# Patient Record
Sex: FEMALE | Race: AMERICAN INDIAN OR ALASKA NATIVE | NOT HISPANIC OR LATINO | URBAN - METROPOLITAN AREA
[De-identification: names, ages, dates, MRNs, and addresses within clinical notes are randomized per-mention and may not be internally consistent; named-entity substitution may affect disease eponyms.]

---

## 2018-03-16 ENCOUNTER — EMERGENCY (EMERGENCY)
Facility: HOSPITAL | Age: 38
LOS: 0 days | Discharge: HOME | End: 2018-03-16
Attending: EMERGENCY MEDICINE

## 2018-03-16 VITALS
SYSTOLIC BLOOD PRESSURE: 100 MMHG | HEART RATE: 72 BPM | OXYGEN SATURATION: 99 % | DIASTOLIC BLOOD PRESSURE: 61 MMHG | RESPIRATION RATE: 18 BRPM

## 2018-03-16 VITALS
DIASTOLIC BLOOD PRESSURE: 78 MMHG | OXYGEN SATURATION: 100 % | TEMPERATURE: 99 F | SYSTOLIC BLOOD PRESSURE: 106 MMHG | HEART RATE: 84 BPM | RESPIRATION RATE: 18 BRPM

## 2018-03-16 DIAGNOSIS — R42 DIZZINESS AND GIDDINESS: ICD-10-CM

## 2018-03-16 DIAGNOSIS — R55 SYNCOPE AND COLLAPSE: ICD-10-CM

## 2018-03-16 NOTE — ED PROVIDER NOTE - PHYSICAL EXAMINATION
Alert, NAD, WDWN, well-appearing  PERRL, EOMI, normal pupils, no icterus, normal external ENT, pink/moist membranes  Airway intact, Lungs CTAB, no wheezing or rhonchi, normal resp effort w/o tachypnea, no retractions, speaking full sentences  CVS1S2, RRR, no m/g/r, 2+ pulses b/l, warm/well-perfused  Abdomen soft, nondistended, no tenderness on palpation to all 4 quadrants, no rebound or rigidity, no CVA tenderness  Skin warm/dry, no acute rash  AAOx3, CN 2-12 intact, no nystagmus, no pronator drift, normal motor, normal sensory, normal gait, no romberg's

## 2018-03-16 NOTE — ED ADULT NURSE NOTE - ASSOCIATED SYMPTOMS
pt is in the ED for an episode that occurred today , near syncope episode, pt denies loc , falling . pt states "felt weak but was aware of all surroundings " symptoms are resolved at this time

## 2018-03-16 NOTE — ED ADULT TRIAGE NOTE - NS ED NURSE BANDS TYPE
No results found for: ABORH, LABANTI, ABID    Chief Complaint   Patient presents with   • Routine Prenatal Visit     Pt. complains of having a yeast infection and states that her insurance will not cover Terazol cream.  Pt was given a sample       Today Chasity complains of vaginal discharge  See ob flowsheet for physical exam.    Prenatal Assessment  Fetal Heart Rate: 140  Fundal Height (cm): 26 cm  Movement: Present  Prenatal Vitals  BP: 110/70  Weight: 134 lb (60.8 kg)  Vaginal Drainage  Draining Fluid: Yes  Edema  LLE Edema: None  RLE Edema: None  Facial Edema: None     Tests done today: Swab for yeast and bacterial vaginosis  At the time of the next visit, she will need to have none    Impression:   Encounter Diagnoses   Name Primary?   • Habitual aborter, antepartum condition or complication, second trimester Yes   • Depression with anxiety    • Vaginal discharge during pregnancy in second trimester        Plan: Patient's Glucola was 92, discharge still present will do one swab for yeast and bacterial infection, return 2 weeks    This note was electronically signed.    Hakeem Retana MD    Name band;

## 2018-03-16 NOTE — ED PROVIDER NOTE - NS ED ROS FT
Review of Systems:  	•	CONSTITUTIONAL - no fever, no diaphoresis, no chills  	•	SKIN - no rash  	•	RESPIRATORY - no shortness of breath, no cough  	•	CARDIAC - no chest pain, no palpitations  	•	GI - no abd pain, no vomiting, no diarrhea  	•	MUSCULOSKELETAL - no joint paint, no swelling, no redness  	•	NEUROLOGIC - +near syncope, vertigo

## 2018-03-16 NOTE — ED PROVIDER NOTE - OBJECTIVE STATEMENT
37 yr old female, no sig PMH, presenting with pre-syncopal episode which occurred while at a routine clinic visit, bent down and then became acutely dizzy, had vertigo and became nauseous, felt like she was gonna pass out, denies any LOC or chest pain, SOB, abd pain, vomiting, or diarrhea, episode lasted 15 mins then all symptoms resolved, currently asymptomatic with no complaints

## 2018-03-16 NOTE — ED PROVIDER NOTE - ATTENDING CONTRIBUTION TO CARE
.attm I personally evaluated the patient. I reviewed the Resident’s or Physician Assistant’s note (as assigned above), and agree with the findings and plan except as documented in my note.   a

## 2018-12-19 ENCOUNTER — EMERGENCY (EMERGENCY)
Facility: HOSPITAL | Age: 38
LOS: 0 days | Discharge: HOME | End: 2018-12-20
Attending: EMERGENCY MEDICINE | Admitting: EMERGENCY MEDICINE

## 2018-12-19 VITALS
RESPIRATION RATE: 20 BRPM | OXYGEN SATURATION: 98 % | TEMPERATURE: 98 F | DIASTOLIC BLOOD PRESSURE: 72 MMHG | WEIGHT: 175.05 LBS | HEART RATE: 61 BPM | SYSTOLIC BLOOD PRESSURE: 120 MMHG | HEIGHT: 66 IN

## 2018-12-19 DIAGNOSIS — M79.602 PAIN IN LEFT ARM: ICD-10-CM

## 2018-12-19 DIAGNOSIS — R42 DIZZINESS AND GIDDINESS: ICD-10-CM

## 2018-12-19 DIAGNOSIS — R20.2 PARESTHESIA OF SKIN: ICD-10-CM

## 2018-12-19 DIAGNOSIS — R07.9 CHEST PAIN, UNSPECIFIED: ICD-10-CM

## 2018-12-19 DIAGNOSIS — R06.02 SHORTNESS OF BREATH: ICD-10-CM

## 2018-12-20 VITALS
DIASTOLIC BLOOD PRESSURE: 62 MMHG | RESPIRATION RATE: 18 BRPM | HEART RATE: 73 BPM | OXYGEN SATURATION: 100 % | SYSTOLIC BLOOD PRESSURE: 98 MMHG | TEMPERATURE: 98 F

## 2018-12-20 LAB
ALBUMIN SERPL ELPH-MCNC: 4.1 G/DL — SIGNIFICANT CHANGE UP (ref 3.5–5.2)
ALP SERPL-CCNC: 58 U/L — SIGNIFICANT CHANGE UP (ref 30–115)
ALT FLD-CCNC: 13 U/L — SIGNIFICANT CHANGE UP (ref 0–41)
ANION GAP SERPL CALC-SCNC: 14 MMOL/L — SIGNIFICANT CHANGE UP (ref 7–14)
AST SERPL-CCNC: 15 U/L — SIGNIFICANT CHANGE UP (ref 0–41)
BILIRUB SERPL-MCNC: 0.2 MG/DL — SIGNIFICANT CHANGE UP (ref 0.2–1.2)
BUN SERPL-MCNC: 13 MG/DL — SIGNIFICANT CHANGE UP (ref 10–20)
CALCIUM SERPL-MCNC: 8.7 MG/DL — SIGNIFICANT CHANGE UP (ref 8.5–10.1)
CHLORIDE SERPL-SCNC: 101 MMOL/L — SIGNIFICANT CHANGE UP (ref 98–110)
CO2 SERPL-SCNC: 24 MMOL/L — SIGNIFICANT CHANGE UP (ref 17–32)
CREAT SERPL-MCNC: 0.7 MG/DL — SIGNIFICANT CHANGE UP (ref 0.7–1.5)
GLUCOSE SERPL-MCNC: 88 MG/DL — SIGNIFICANT CHANGE UP (ref 70–99)
HCT VFR BLD CALC: 36 % — LOW (ref 37–47)
HGB BLD-MCNC: 12.3 G/DL — SIGNIFICANT CHANGE UP (ref 12–16)
MCHC RBC-ENTMCNC: 29.1 PG — SIGNIFICANT CHANGE UP (ref 27–31)
MCHC RBC-ENTMCNC: 34.2 G/DL — SIGNIFICANT CHANGE UP (ref 32–37)
MCV RBC AUTO: 85.1 FL — SIGNIFICANT CHANGE UP (ref 81–99)
NRBC # BLD: 0 /100 WBCS — SIGNIFICANT CHANGE UP (ref 0–0)
PLATELET # BLD AUTO: 180 K/UL — SIGNIFICANT CHANGE UP (ref 130–400)
POTASSIUM SERPL-MCNC: 3.9 MMOL/L — SIGNIFICANT CHANGE UP (ref 3.5–5)
POTASSIUM SERPL-SCNC: 3.9 MMOL/L — SIGNIFICANT CHANGE UP (ref 3.5–5)
PROT SERPL-MCNC: 7.1 G/DL — SIGNIFICANT CHANGE UP (ref 6–8)
RBC # BLD: 4.23 M/UL — SIGNIFICANT CHANGE UP (ref 4.2–5.4)
RBC # FLD: 12.8 % — SIGNIFICANT CHANGE UP (ref 11.5–14.5)
SODIUM SERPL-SCNC: 139 MMOL/L — SIGNIFICANT CHANGE UP (ref 135–146)
TROPONIN T SERPL-MCNC: <0.01 NG/ML — SIGNIFICANT CHANGE UP
WBC # BLD: 5.47 K/UL — SIGNIFICANT CHANGE UP (ref 4.8–10.8)
WBC # FLD AUTO: 5.47 K/UL — SIGNIFICANT CHANGE UP (ref 4.8–10.8)

## 2018-12-20 RX ORDER — SODIUM CHLORIDE 9 MG/ML
1000 INJECTION INTRAMUSCULAR; INTRAVENOUS; SUBCUTANEOUS
Qty: 0 | Refills: 0 | Status: DISCONTINUED | OUTPATIENT
Start: 2018-12-20 | End: 2018-12-20

## 2018-12-20 RX ADMIN — SODIUM CHLORIDE 150 MILLILITER(S): 9 INJECTION INTRAMUSCULAR; INTRAVENOUS; SUBCUTANEOUS at 01:07

## 2018-12-20 NOTE — ED CDU PROVIDER DISPOSITION NOTE - CARE PROVIDER_API CALL
Won Sorensen), Cardiovascular Disease; Interventional Cardiology  [address] [place] [address]  Phone: [phone]  Fax: [phone]

## 2018-12-20 NOTE — ED CDU PROVIDER INITIAL DAY NOTE - OBJECTIVE STATEMENT
37yo F no sig pmh non smoker no FHx pw LUE heaviness, lightheadedness since yesterday- no headache vision changes, no sx currently- no f/c/n/v/d, chest pain, shortness of breath, No THOMAS, orthopnea, weight gain, LE edema. No hx DVT/PE, no LE swelling/pain, no recent travel/trauma, no exogenous hormone use, no known active malignancy.

## 2018-12-20 NOTE — CONSULT NOTE ADULT - SUBJECTIVE AND OBJECTIVE BOX
HPI:      ROS:  Constitutional, Neurological, Psychiatric, Eyes, ENT, Cardiovascular, Respiratory, Gastrointestinal, Genitourinary, Musculoskeletal, Integumentary, Endocrine and Heme/Lymph are otherwise negative.     Physical Exam:  Constitutional: alert and in no acute distress.  Eyes: the sclera and conjunctiva were normal, pupils were equal in size, round, reactive to light, with normal accommodation and extraocular movements were intact.   Back: no costovertebral angle tenderness and no spinal tenderness.      Neuro Exam:  Orientation: oriented to person, oriented to place and oriented to time.   Attention: normal concentrating ability and visual attention was not decreased.   Language: no difficulty naming common objects, no difficulty repeating a phrase, no difficulty writing a sentence, fluency intact, comprehension intact and reading intact.   Fund of knowledge: displays adequate knowledge of personal past history.   Cranial Nerves: visual acuity intact bilaterally, visual fields full to confrontation, pupils equal round and reactive to light, extraocular motion intact, facial sensation intact symmetrically, face symmetrical, hearing was intact bilaterally, tongue and palate midline, head turning and shoulder shrug symmetric and there was no tongue deviation with protrusion.   Motor: muscle tone was normal in all four extremities, muscle strength was normal in all four extremities and normal bulk in all four extremities.   Sensory exam: light touch was intact.   Coordination:. normal gait. balance was intact. there was no past-pointing. no tremor present.   Deep tendon reflexes:   Biceps right 2+. Biceps left 2+.    Triceps right 2+. Triceps left 2+.  LOC  Brachioradialis right 2+. Brachioradialis left 2+.    Patella right 2+. Patella left 2+.    Ankle jerk right 2+. Ankle jerk left 2+.   Plantar responses normal on the right, normal on the left.      Allergies    No Known Allergies    Intolerances      MEDICATIONS  (STANDING):  sodium chloride 0.9%. 1000 milliLiter(s) (150 mL/Hr) IV Continuous <Continuous>    MEDICATIONS  (PRN):      LABS:                        12.3   5.47  )-----------( 180      ( 20 Dec 2018 00:40 )             36.0     12-20    139  |  101  |  13  ----------------------------<  88  3.9   |  24  |  0.7    Ca    8.7      20 Dec 2018 00:40    TPro  7.1  /  Alb  4.1  /  TBili  0.2  /  DBili  x   /  AST  15  /  ALT  13  /  AlkPhos  58  12-20  Troponin T, Serum: <0.01 ng/mL (12.20.18 @ 00:40)                Neuro Imaging:  < from: CT Head No Cont (12.20.18 @ 01:59) >  The ventricular system, basal cisterns, and sulcal pattern are   appropriate for patients age.    There is no acute extra-axial collection.  No mass effect, midline shift   or hemorrhage is seen.      Gray-white differentiation appears grossly preserved.    There is no depressed skull fracture.     Impression:    No evidence of acute intracranial pathology.    If patient continues to be symptomatic follow-up MRI of the brain may be   helpful for further evaluation.    < end of copied text >    EKG    < from: 12 Lead ECG (12.19.18 @ 21:44) >  Diagnosis Line Sinus bradycardia  Low voltage QRS  Borderline ECG    < end of copied text > HPI: 39 y/o female with no significant PMH on no medications presents to ED s/p episode of left arm pain and heaviness yesterday that lasted 5 minutes. Not symptomatic now. Denies any accompanying numbness, tingling, headache, dizziness. CT head neg, EKG, troponins negative    ROS:  Constitutional, Neurological, Psychiatric, Eyes, ENT, Cardiovascular, Respiratory, Gastrointestinal, Genitourinary, Musculoskeletal, Integumentary, Endocrine and Heme/Lymph are otherwise negative.     Physical Exam:  ICU Vital Signs Last 24 Hrs  T(C): 36.1 (20 Dec 2018 00:08), Max: 36.6 (19 Dec 2018 21:46)  T(F): 97 (20 Dec 2018 00:08), Max: 97.9 (19 Dec 2018 21:46)  HR: 62 (20 Dec 2018 00:08) (61 - 62)  BP: 110/54 (20 Dec 2018 00:08) (110/54 - 120/72)  RR: 20 (20 Dec 2018 00:08) (20 - 20)  SpO2: 100% (20 Dec 2018 00:08) (98% - 100%)    Neuro Exam:  Orientation: oriented to person, oriented to place and oriented to time.   Attention: normal concentrating ability and visual attention was not decreased.   Language: no difficulty naming common objects, no difficulty repeating a phrase, fluency intact.   Fund of knowledge: displays adequate knowledge of personal past history.   Cranial Nerves: visual acuity intact bilaterally, visual fields full to confrontation, pupils equal round and reactive to light, extraocular motion intact, facial sensation intact symmetrically, face symmetrical, hearing was intact bilaterally, tongue and palate midline, head turning and shoulder shrug symmetric and there was no tongue deviation with protrusion.   Motor: muscle tone was normal in all four extremities, muscle strength was normal in all four extremities and normal bulk in all four extremities.   Sensory exam: light touch was intact, vibration intact  Coordination:. there was no past-pointing. no tremor present.   Deep tendon reflexes:   Biceps right 2+. Biceps left 2+.    Triceps right 2+. Triceps left 2+.  LOC  Brachioradialis right 2+. Brachioradialis left 2+.    Patella right 2+. Patella left 2+.    Ankle jerk right 2+. Ankle jerk left 2+.     Allergies    No Known Allergies    Intolerances      MEDICATIONS  (STANDING):  sodium chloride 0.9%. 1000 milliLiter(s) (150 mL/Hr) IV Continuous <Continuous>    MEDICATIONS  (PRN):      LABS:                        12.3   5.47  )-----------( 180      ( 20 Dec 2018 00:40 )             36.0     12-20    139  |  101  |  13  ----------------------------<  88  3.9   |  24  |  0.7    Ca    8.7      20 Dec 2018 00:40    TPro  7.1  /  Alb  4.1  /  TBili  0.2  /  DBili  x   /  AST  15  /  ALT  13  /  AlkPhos  58  12-20  Troponin T, Serum: <0.01 ng/mL (12.20.18 @ 00:40)    Neuro Imaging:  < from: CT Head No Cont (12.20.18 @ 01:59) >  The ventricular system, basal cisterns, and sulcal pattern are   appropriate for patients age.    There is no acute extra-axial collection.  No mass effect, midline shift   or hemorrhage is seen.      Gray-white differentiation appears grossly preserved.    There is no depressed skull fracture.     Impression:    No evidence of acute intracranial pathology.    If patient continues to be symptomatic follow-up MRI of the brain may be   helpful for further evaluation.    < end of copied text >    EKG    < from: 12 Lead ECG (12.19.18 @ 21:44) >  Diagnosis Line Sinus bradycardia  Low voltage QRS  Borderline ECG    < end of copied text > HPI: 37 y/o female with no significant PMH on no medications presents to ED s/p episode of left arm pain and heaviness yesterday that lasted 5 minutes. Not symptomatic now. Denies any accompanying numbness, tingling, headache, dizziness. CT head neg, EKG, troponins negative  No face or leg involvement.  Symptoms localized to lower humerus and upper forearm not involving the hand.  No  weakness or proximal weakness.  No skin changes or dysesthesias/paresthsias.  No recent trauma.  No neck pain or shoulder/axillary pain.  back to baseline.      ROS:  Constitutional, Neurological, Psychiatric, Eyes, ENT, Cardiovascular, Respiratory, Gastrointestinal, Genitourinary, Musculoskeletal, Integumentary, Endocrine and Heme/Lymph are otherwise negative.     Physical Exam:  ICU Vital Signs Last 24 Hrs  T(C): 36.1 (20 Dec 2018 00:08), Max: 36.6 (19 Dec 2018 21:46)  T(F): 97 (20 Dec 2018 00:08), Max: 97.9 (19 Dec 2018 21:46)  HR: 62 (20 Dec 2018 00:08) (61 - 62)  BP: 110/54 (20 Dec 2018 00:08) (110/54 - 120/72)  RR: 20 (20 Dec 2018 00:08) (20 - 20)  SpO2: 100% (20 Dec 2018 00:08) (98% - 100%)    Neuro Exam:  Orientation: oriented to person, oriented to place and oriented to time.   Attention: normal concentrating ability and visual attention was not decreased.   Language: no difficulty naming common objects, no difficulty repeating a phrase, fluency intact.   Fund of knowledge: displays adequate knowledge of personal past history.   Cranial Nerves: visual acuity intact bilaterally, visual fields full to confrontation, pupils equal round and reactive to light, extraocular motion intact, facial sensation intact symmetrically, face symmetrical, hearing was intact bilaterally, tongue and palate midline, head turning and shoulder shrug symmetric and there was no tongue deviation with protrusion.   Motor: muscle tone was normal in all four extremities, muscle strength was normal in all four extremities and normal bulk in all four extremities.  LUE GHADA/TR/BB/SPS/IFS/Sup/PT/EDC/FDI/ADM/APB/ 5/5.  no winging.  no TTP erb's point or shoulder.    Sensory exam: light touch was intact, vibration intact  Coordination:. there was no past-pointing. no tremor present.   Deep tendon reflexes:   Biceps right 2+. Biceps left 2+.    Triceps right 2+. Triceps left 2+.  LOC  Brachioradialis right 2+. Brachioradialis left 2+.    Patella right 2+. Patella left 2+.    Ankle jerk right 2+. Ankle jerk left 2+.     Allergies    No Known Allergies    Intolerances      MEDICATIONS  (STANDING):  sodium chloride 0.9%. 1000 milliLiter(s) (150 mL/Hr) IV Continuous <Continuous>    MEDICATIONS  (PRN):      LABS:                        12.3   5.47  )-----------( 180      ( 20 Dec 2018 00:40 )             36.0     12-20    139  |  101  |  13  ----------------------------<  88  3.9   |  24  |  0.7    Ca    8.7      20 Dec 2018 00:40    TPro  7.1  /  Alb  4.1  /  TBili  0.2  /  DBili  x   /  AST  15  /  ALT  13  /  AlkPhos  58  12-20  Troponin T, Serum: <0.01 ng/mL (12.20.18 @ 00:40)    Neuro Imaging:  < from: CT Head No Cont (12.20.18 @ 01:59) >  The ventricular system, basal cisterns, and sulcal pattern are   appropriate for patients age.    There is no acute extra-axial collection.  No mass effect, midline shift   or hemorrhage is seen.      Gray-white differentiation appears grossly preserved.    There is no depressed skull fracture.     Impression:    No evidence of acute intracranial pathology.    If patient continues to be symptomatic follow-up MRI of the brain may be   helpful for further evaluation.    < end of copied text >    EKG    < from: 12 Lead ECG (12.19.18 @ 21:44) >  Diagnosis Line Sinus bradycardia  Low voltage QRS  Borderline ECG    < end of copied text >

## 2018-12-20 NOTE — ED CDU PROVIDER DISPOSITION NOTE - CLINICAL COURSE
pt presented to ed for left arm heaviness and achiness. pt placed in edou for further eval and possible cardiac etiology. pt with ekg wnl, 2 sets negative, cxray napd, no events on cardiac monitor. ct head negative. pt seen by neuro no intervention needed. pt didn't want to stay for stress. pt ama'ed. pt understood risk. with family at bedside. The patient wishes to leave against medical advice.  I have discussed the risks, benefits and alternatives (including the possibility of worsening of disease, pain, permanent disability, and/or death) with the patient and his/her family (if available).  The patient voices understanding of these risks, benefits, and alternatives and still wishes to sign out against medical advice.  The patient is awake, alert, oriented  x 3 and has demonstrated capacity to refuse/direct care.  I have advised the patient that they can and should return immediately should they develop any worse/different/additional symptoms, or if they change their mind and want to continue their care.

## 2018-12-20 NOTE — ED PROVIDER NOTE - OBJECTIVE STATEMENT
39 yo female presented c/o heaviness in left upper extremity that started about a couple hours prior to arrival. Pt reported feeling heaviness and tingling. Denied any weakness, neck pain, HA, dizziness. No CP, SOB or palpitations. Denied any trauma or falls. No hx similar sx in past. No N/V/D or abdominal pain.

## 2018-12-20 NOTE — ED CDU PROVIDER INITIAL DAY NOTE - PHYSICAL EXAMINATION
Con: Well appearing NAD non toxic.   HEENT: NCAT EOMI conjunctiva normal. No nasal discharge. MMM.   Neck: Supple, non tender, full ROM.   CV: RRR no MRG +S1S2.   Pulm: CTA b/l.   Abd: s NT ND +BS.   Ext: WWP x4, moving all extremities, no edema.   Psy: Cooperative, appropriate.   Skin: Warm, dry, no rash

## 2018-12-20 NOTE — ED ADULT NURSE NOTE - OBJECTIVE STATEMENT
Pt c/o one episode of left arm pain and weakness that lasted for a few minutes then subsided. Pt denies fevers, chills, nausea, vomiting, diarrhea, chest pain, shortness of breath, headache, or visual changes.

## 2018-12-20 NOTE — CONSULT NOTE ADULT - ATTENDING COMMENTS
This is a 39 y/o female with no significant PMH presents to the ED with sensation of transient LUE achiness lasting 5 minutes with spontaneous resolution. No symptoms now back to baseline. ? myofascial strain.  doubt neurologic.     Plan:  - no further inpt neurologic w/u  - f/u as outpt if symptoms recur

## 2018-12-20 NOTE — ED PROVIDER NOTE - PHYSICAL EXAMINATION
VITAL SIGNS: noted  CONSTITUTIONAL: Well-developed; well-nourished; in no acute distress  HEAD: Normocephalic; atraumatic  EYES: PERRL, EOM intact; conjunctiva and sclera clear  ENT: No nasal discharge; airway clear. MMM  NECK: Supple; non tender.   CARD: S1, S2 normal; no murmurs, gallops, or rubs. Regular rate and rhythm  RESP: CTAB/L, no wheezes, rales or rhonchi  ABD: Normal bowel sounds; soft; non-distended; non-tender; no hepatosplenomegaly. No CVA tenderness  EXT: Normal ROM. No calf tenderness or edema. Distal pulses intact  NEURO: AAO x 3, normal speech, no facial asymmetry, negative pronator drift, no ataxia, negative Romberg, no dysdiadokinesia, no nystagmus, sensory equal and intact.   SKIN: Skin exam is warm and dry, no acute rash  MS: No midline spinal tenderness, Left shoulder nontender with FROM, no scapular or paraspinal tenderness

## 2018-12-20 NOTE — CONSULT NOTE ADULT - ASSESSMENT
Impression:  This is a 37 y/o female with PMH presenting to the ED with sensation of left arm heaviness Impression:  This is a 37 y/o female with no significant PMH presents to the ED with sensation of left arm heaviness and pain yesterday x 5 minutes. No symptoms now. Neuro exam is within normal limits.    Plan:  Discharge home after neuro attending rounds with PMD follow-up this week

## 2019-07-16 ENCOUNTER — APPOINTMENT (OUTPATIENT)
Dept: SURGERY | Facility: CLINIC | Age: 39
End: 2019-07-16
Payer: MEDICAID

## 2019-07-16 VITALS
BODY MASS INDEX: 26.66 KG/M2 | HEIGHT: 69 IN | DIASTOLIC BLOOD PRESSURE: 68 MMHG | SYSTOLIC BLOOD PRESSURE: 110 MMHG | WEIGHT: 180 LBS

## 2019-07-16 PROCEDURE — 99203 OFFICE O/P NEW LOW 30 MIN: CPT

## 2019-07-16 NOTE — PHYSICAL EXAM
[JVD] : no jugular venous distention  [Normal Breath Sounds] : Normal breath sounds [Abdominal Masses] : No abdominal masses [Abdomen Tenderness] : ~T ~M No abdominal tenderness [No Rash or Lesion] : No rash or lesion [Alert] : alert [Oriented to Person] : oriented to person [Oriented to Place] : oriented to place [Oriented to Time] : oriented to time [Calm] : calm [de-identified] : appears health [de-identified] : JAZ [de-identified] : upper back 2 cm sebaceous cyst ,

## 2019-07-16 NOTE — HISTORY OF PRESENT ILLNESS
[de-identified] : had infected upper back sebaceous cyst that was noticed few weeks ago ,with cellulitis and drainage .

## 2019-07-16 NOTE — REASON FOR VISIT
[Initial Evaluation] : an initial evaluation [FreeTextEntry1] : for infected upper back sebaceous cyst

## 2019-08-02 ENCOUNTER — APPOINTMENT (OUTPATIENT)
Dept: SURGERY | Facility: CLINIC | Age: 39
End: 2019-08-02
Payer: MEDICAID

## 2019-08-02 VITALS
BODY MASS INDEX: 26.81 KG/M2 | WEIGHT: 181 LBS | DIASTOLIC BLOOD PRESSURE: 68 MMHG | SYSTOLIC BLOOD PRESSURE: 110 MMHG | HEIGHT: 69 IN

## 2019-08-02 DIAGNOSIS — Z00.00 ENCOUNTER FOR GENERAL ADULT MEDICAL EXAMINATION W/OUT ABNORMAL FINDINGS: ICD-10-CM

## 2019-08-02 PROCEDURE — 99212 OFFICE O/P EST SF 10 MIN: CPT

## 2019-08-02 NOTE — PHYSICAL EXAM
[JVD] : no jugular venous distention  [Abdominal Masses] : No abdominal masses [Abdomen Tenderness] : ~T ~M No abdominal tenderness [No HSM] : no hepatosplenomegaly [No Rash or Lesion] : No rash or lesion [Alert] : alert [Oriented to Person] : oriented to person [Oriented to Place] : oriented to place [Oriented to Time] : oriented to time [Calm] : calm [de-identified] : JAZ [de-identified] : doing well  [de-identified] : upper back cyst drained , still with induration and some cellulitis

## 2019-08-02 NOTE — REASON FOR VISIT
[Follow-Up: _____] : a [unfilled] follow-up visit [FreeTextEntry1] : recently upper back sebaceous cyst was scheduled for surgery , since cyst was opened and drain here to evaluate for need of the surgery

## 2019-08-08 ENCOUNTER — OUTPATIENT (OUTPATIENT)
Dept: OUTPATIENT SERVICES | Facility: HOSPITAL | Age: 39
LOS: 1 days | Discharge: HOME | End: 2019-08-08
Payer: MEDICAID

## 2019-08-08 VITALS
SYSTOLIC BLOOD PRESSURE: 112 MMHG | DIASTOLIC BLOOD PRESSURE: 72 MMHG | HEART RATE: 78 BPM | TEMPERATURE: 98 F | WEIGHT: 181 LBS | RESPIRATION RATE: 18 BRPM | OXYGEN SATURATION: 100 % | HEIGHT: 69 IN

## 2019-08-08 DIAGNOSIS — Z01.818 ENCOUNTER FOR OTHER PREPROCEDURAL EXAMINATION: ICD-10-CM

## 2019-08-08 DIAGNOSIS — L72.3 SEBACEOUS CYST: ICD-10-CM

## 2019-08-08 DIAGNOSIS — Z98.51 TUBAL LIGATION STATUS: Chronic | ICD-10-CM

## 2019-08-08 LAB
ALBUMIN SERPL ELPH-MCNC: 4.1 G/DL — SIGNIFICANT CHANGE UP (ref 3.5–5.2)
ALP SERPL-CCNC: 49 U/L — SIGNIFICANT CHANGE UP (ref 30–115)
ALT FLD-CCNC: 13 U/L — SIGNIFICANT CHANGE UP (ref 0–41)
ANION GAP SERPL CALC-SCNC: 8 MMOL/L — SIGNIFICANT CHANGE UP (ref 7–14)
AST SERPL-CCNC: 15 U/L — SIGNIFICANT CHANGE UP (ref 0–41)
BASOPHILS # BLD AUTO: 0.04 K/UL — SIGNIFICANT CHANGE UP (ref 0–0.2)
BASOPHILS NFR BLD AUTO: 0.7 % — SIGNIFICANT CHANGE UP (ref 0–1)
BILIRUB SERPL-MCNC: 0.4 MG/DL — SIGNIFICANT CHANGE UP (ref 0.2–1.2)
BUN SERPL-MCNC: 15 MG/DL — SIGNIFICANT CHANGE UP (ref 10–20)
CALCIUM SERPL-MCNC: 9.1 MG/DL — SIGNIFICANT CHANGE UP (ref 8.5–10.1)
CHLORIDE SERPL-SCNC: 106 MMOL/L — SIGNIFICANT CHANGE UP (ref 98–110)
CO2 SERPL-SCNC: 26 MMOL/L — SIGNIFICANT CHANGE UP (ref 17–32)
CREAT SERPL-MCNC: 0.8 MG/DL — SIGNIFICANT CHANGE UP (ref 0.7–1.5)
EOSINOPHIL # BLD AUTO: 0.26 K/UL — SIGNIFICANT CHANGE UP (ref 0–0.7)
EOSINOPHIL NFR BLD AUTO: 4.9 % — SIGNIFICANT CHANGE UP (ref 0–8)
GLUCOSE SERPL-MCNC: 74 MG/DL — SIGNIFICANT CHANGE UP (ref 70–99)
HCT VFR BLD CALC: 37.6 % — SIGNIFICANT CHANGE UP (ref 37–47)
HGB BLD-MCNC: 12.1 G/DL — SIGNIFICANT CHANGE UP (ref 12–16)
IMM GRANULOCYTES NFR BLD AUTO: 0.2 % — SIGNIFICANT CHANGE UP (ref 0.1–0.3)
LYMPHOCYTES # BLD AUTO: 1.92 K/UL — SIGNIFICANT CHANGE UP (ref 1.2–3.4)
LYMPHOCYTES # BLD AUTO: 36 % — SIGNIFICANT CHANGE UP (ref 20.5–51.1)
MCHC RBC-ENTMCNC: 28 PG — SIGNIFICANT CHANGE UP (ref 27–31)
MCHC RBC-ENTMCNC: 32.2 G/DL — SIGNIFICANT CHANGE UP (ref 32–37)
MCV RBC AUTO: 87 FL — SIGNIFICANT CHANGE UP (ref 81–99)
MONOCYTES # BLD AUTO: 0.41 K/UL — SIGNIFICANT CHANGE UP (ref 0.1–0.6)
MONOCYTES NFR BLD AUTO: 7.7 % — SIGNIFICANT CHANGE UP (ref 1.7–9.3)
NEUTROPHILS # BLD AUTO: 2.7 K/UL — SIGNIFICANT CHANGE UP (ref 1.4–6.5)
NEUTROPHILS NFR BLD AUTO: 50.5 % — SIGNIFICANT CHANGE UP (ref 42.2–75.2)
NRBC # BLD: 0 /100 WBCS — SIGNIFICANT CHANGE UP (ref 0–0)
PLATELET # BLD AUTO: 181 K/UL — SIGNIFICANT CHANGE UP (ref 130–400)
POTASSIUM SERPL-MCNC: 4.4 MMOL/L — SIGNIFICANT CHANGE UP (ref 3.5–5)
POTASSIUM SERPL-SCNC: 4.4 MMOL/L — SIGNIFICANT CHANGE UP (ref 3.5–5)
PROT SERPL-MCNC: 7 G/DL — SIGNIFICANT CHANGE UP (ref 6–8)
RBC # BLD: 4.32 M/UL — SIGNIFICANT CHANGE UP (ref 4.2–5.4)
RBC # FLD: 13.2 % — SIGNIFICANT CHANGE UP (ref 11.5–14.5)
SODIUM SERPL-SCNC: 140 MMOL/L — SIGNIFICANT CHANGE UP (ref 135–146)
WBC # BLD: 5.34 K/UL — SIGNIFICANT CHANGE UP (ref 4.8–10.8)
WBC # FLD AUTO: 5.34 K/UL — SIGNIFICANT CHANGE UP (ref 4.8–10.8)

## 2019-08-08 PROCEDURE — 93010 ELECTROCARDIOGRAM REPORT: CPT

## 2019-08-08 NOTE — H&P PST ADULT - NSANTHOSAYNRD_GEN_A_CORE
No. ASCENCION screening performed.  STOP BANG Legend: 0-2 = LOW Risk; 3-4 = INTERMEDIATE Risk; 5-8 = HIGH Risk

## 2019-08-08 NOTE — H&P PST ADULT - REASON FOR ADMISSION
PT PRESENTS TO PAST WITH NO SOB, CP, PALPITATIONS, DYSURIA, UTI OR URI AT PRESENT.   PT ABLE TO WALK UP 2-3 FLIGHTS OF STEPS WITH NO SOB.  AS PER THE PT, THIS IS HIS/HER COMPLETE MEDICAL AND SURGICAL HX, INCLUDING MEDICATIONS PRESCRIBED AND OVER THE COUNTER  PT PRESENTS TO PAST WITH H/O-UPPER BACK SEBACEOUS CYST.

## 2019-08-08 NOTE — H&P PST ADULT - RS GEN PE MLT RESP DETAILS PC
airway patent/respirations non-labored/normal/good air movement/clear to auscultation bilaterally/no chest wall tenderness/no intercostal retractions/breath sounds equal

## 2019-08-15 ENCOUNTER — RESULT REVIEW (OUTPATIENT)
Age: 39
End: 2019-08-15

## 2019-08-15 ENCOUNTER — APPOINTMENT (OUTPATIENT)
Dept: SURGERY | Facility: AMBULATORY SURGERY CENTER | Age: 39
End: 2019-08-15

## 2019-08-15 ENCOUNTER — OUTPATIENT (OUTPATIENT)
Dept: OUTPATIENT SERVICES | Facility: HOSPITAL | Age: 39
LOS: 1 days | Discharge: HOME | End: 2019-08-15
Payer: MEDICAID

## 2019-08-15 VITALS
DIASTOLIC BLOOD PRESSURE: 61 MMHG | RESPIRATION RATE: 16 BRPM | HEART RATE: 65 BPM | SYSTOLIC BLOOD PRESSURE: 121 MMHG | HEIGHT: 69 IN | WEIGHT: 181 LBS | TEMPERATURE: 99 F | OXYGEN SATURATION: 99 %

## 2019-08-15 VITALS
RESPIRATION RATE: 12 BRPM | OXYGEN SATURATION: 100 % | SYSTOLIC BLOOD PRESSURE: 112 MMHG | DIASTOLIC BLOOD PRESSURE: 69 MMHG | HEART RATE: 64 BPM

## 2019-08-15 DIAGNOSIS — Z98.51 TUBAL LIGATION STATUS: Chronic | ICD-10-CM

## 2019-08-15 PROCEDURE — 11402 EXC TR-EXT B9+MARG 1.1-2 CM: CPT

## 2019-08-15 PROCEDURE — 88304 TISSUE EXAM BY PATHOLOGIST: CPT | Mod: 26

## 2019-08-15 RX ORDER — ACETAMINOPHEN 500 MG
650 TABLET ORAL ONCE
Refills: 0 | Status: COMPLETED | OUTPATIENT
Start: 2019-08-15 | End: 2019-08-15

## 2019-08-15 RX ORDER — ONDANSETRON 8 MG/1
4 TABLET, FILM COATED ORAL ONCE
Refills: 0 | Status: DISCONTINUED | OUTPATIENT
Start: 2019-08-15 | End: 2019-08-30

## 2019-08-15 RX ORDER — OXYCODONE AND ACETAMINOPHEN 5; 325 MG/1; MG/1
1 TABLET ORAL ONCE
Refills: 0 | Status: DISCONTINUED | OUTPATIENT
Start: 2019-08-15 | End: 2019-08-15

## 2019-08-15 RX ORDER — MORPHINE SULFATE 50 MG/1
2 CAPSULE, EXTENDED RELEASE ORAL
Refills: 0 | Status: DISCONTINUED | OUTPATIENT
Start: 2019-08-15 | End: 2019-08-15

## 2019-08-15 RX ORDER — HYDROMORPHONE HYDROCHLORIDE 2 MG/ML
0.5 INJECTION INTRAMUSCULAR; INTRAVENOUS; SUBCUTANEOUS
Refills: 0 | Status: DISCONTINUED | OUTPATIENT
Start: 2019-08-15 | End: 2019-08-15

## 2019-08-15 RX ORDER — SODIUM CHLORIDE 9 MG/ML
1000 INJECTION, SOLUTION INTRAVENOUS
Refills: 0 | Status: DISCONTINUED | OUTPATIENT
Start: 2019-08-15 | End: 2019-08-30

## 2019-08-15 RX ADMIN — SODIUM CHLORIDE 100 MILLILITER(S): 9 INJECTION, SOLUTION INTRAVENOUS at 11:56

## 2019-08-15 RX ADMIN — Medication 650 MILLIGRAM(S): at 12:50

## 2019-08-15 NOTE — ASU PREOP CHECKLIST - AS BP NONINV METHOD
CC:  Kristine Bermudez is here today for Medication Management (b/p & wellbutrin)       Medications: medications verified, no change  Refills needed today?  No  denies Latex allergy or sensitivity  Patient would like communication of their results via:    Cell Phone:   Telephone Information:   Mobile [phone]     Okay to leave a message containing results? Yes  Tobacco history: verified  Advanced directives: Yes  Patient's current [facility] status: Active.  There are no preventive care reminders to display for this patient.  Patient is up to date, no discussion needed..          [facility] status addressed. Patient Active.         electronic

## 2019-08-15 NOTE — ASU DISCHARGE PLAN (ADULT/PEDIATRIC) - ASU DC SPECIAL INSTRUCTIONSFT
Pain: take tylenol as needed for pain.   Keep dressing dry and on for 48 hours. You may shower normally. The stitches will be removed in clinic.   Follow up with Dr. Boland in clinic in 2 weeks.

## 2019-08-15 NOTE — ASU DISCHARGE PLAN (ADULT/PEDIATRIC) - CALL YOUR DOCTOR IF YOU HAVE ANY OF THE FOLLOWING:
Swelling that gets worse/Pain not relieved by Medications/Fever greater than (need to indicate Fahrenheit or Celsius)/Bleeding that does not stop/Wound/Surgical Site with redness, or foul smelling discharge or pus

## 2019-08-15 NOTE — ASU DISCHARGE PLAN (ADULT/PEDIATRIC) - CARE PROVIDER_API CALL
Scotty Boland)  Surgery; Surgical Critical Care  [address] [place] [address], [address] [place] [address]  Phone: [phone]  Fax: [phone]  Follow Up Time: 2 weeks

## 2019-08-15 NOTE — CHART NOTE - NSCHARTNOTEFT_GEN_A_CORE
PACU ANESTHESIA ADMISSION NOTE      Procedure: Excision of sebaceous cyst of back    Post op diagnosis:  Upper back sebaceous cyst    ____  Intubated  TV:______       Rate: ______      FiO2: ______    _x___  Patent Airway    _x___  Full return of protective reflexes    _x___  Full recovery from anesthesia / back to baseline status    Vitals:            T:     97.5           BP :   84/52             R:    16          Sat: 98%             P: 57      Mental Status:  _x___ Awake   _____ Alert   _____ Drowsy   _____ Sedated    Nausea/Vomiting:  _x___  NO       ______Yes,   See Post - Op Orders         Pain Scale (0-10):  __0___    Treatment: __ None    __x__ See Post - Op/PCA Orders    Post - Operative Fluids:   __x__ Oral   ____ See Post - Op Orders    Plan: Discharge:   _x___Home       _____Floor     _____Critical Care    _____  Other:_________________    Comments:  No anesthesia issues or complications noted.  Discharge when criteria met.

## 2019-08-19 LAB — SURGICAL PATHOLOGY STUDY: SIGNIFICANT CHANGE UP

## 2019-08-21 DIAGNOSIS — L72.0 EPIDERMAL CYST: ICD-10-CM

## 2019-08-30 ENCOUNTER — APPOINTMENT (OUTPATIENT)
Dept: SURGERY | Facility: CLINIC | Age: 39
End: 2019-08-30

## 2019-08-30 ENCOUNTER — APPOINTMENT (OUTPATIENT)
Dept: SURGERY | Facility: CLINIC | Age: 39
End: 2019-08-30
Payer: MEDICAID

## 2019-08-30 VITALS
HEIGHT: 69 IN | SYSTOLIC BLOOD PRESSURE: 120 MMHG | BODY MASS INDEX: 27.11 KG/M2 | WEIGHT: 183 LBS | DIASTOLIC BLOOD PRESSURE: 72 MMHG

## 2019-08-30 DIAGNOSIS — L72.3 SEBACEOUS CYST: ICD-10-CM

## 2019-08-30 PROCEDURE — 99024 POSTOP FOLLOW-UP VISIT: CPT

## 2019-08-30 NOTE — PHYSICAL EXAM
[Normal Breath Sounds] : Normal breath sounds [JVD] : no jugular venous distention  [Abdominal Masses] : No abdominal masses [Abdomen Tenderness] : ~T ~M No abdominal tenderness [No Rash or Lesion] : No rash or lesion [No HSM] : no hepatosplenomegaly [Alert] : alert [Oriented to Person] : oriented to person [Oriented to Place] : oriented to place [Oriented to Time] : oriented to time [Calm] : calm [de-identified] : doing well  [de-identified] : JAZ  [de-identified] : incision healed minimal cellulitis , no drainage sutures removed

## 2019-09-05 NOTE — BRIEF OPERATIVE NOTE - URINE OUTPUT
0 Complex Repair And Transposition Flap Text: The defect edges were debeveled with a #15 scalpel blade.  The primary defect was closed partially with a complex linear closure.  Given the location of the remaining defect, shape of the defect and the proximity to free margins a transposition flap was deemed most appropriate for complete closure of the defect.  Using a sterile surgical marker, an appropriate advancement flap was drawn incorporating the defect and placing the expected incisions within the relaxed skin tension lines where possible.    The area thus outlined was incised deep to adipose tissue with a #15 scalpel blade.  The skin margins were undermined to an appropriate distance in all directions utilizing iris scissors.

## 2019-11-09 ENCOUNTER — EMERGENCY (EMERGENCY)
Facility: HOSPITAL | Age: 39
LOS: 0 days | Discharge: HOME | End: 2019-11-09
Attending: STUDENT IN AN ORGANIZED HEALTH CARE EDUCATION/TRAINING PROGRAM | Admitting: STUDENT IN AN ORGANIZED HEALTH CARE EDUCATION/TRAINING PROGRAM
Payer: MEDICAID

## 2019-11-09 VITALS
DIASTOLIC BLOOD PRESSURE: 85 MMHG | RESPIRATION RATE: 22 BRPM | HEART RATE: 96 BPM | TEMPERATURE: 97 F | SYSTOLIC BLOOD PRESSURE: 132 MMHG | OXYGEN SATURATION: 97 %

## 2019-11-09 DIAGNOSIS — R06.2 WHEEZING: ICD-10-CM

## 2019-11-09 DIAGNOSIS — R05 COUGH: ICD-10-CM

## 2019-11-09 DIAGNOSIS — Z98.51 TUBAL LIGATION STATUS: Chronic | ICD-10-CM

## 2019-11-09 DIAGNOSIS — R07.89 OTHER CHEST PAIN: ICD-10-CM

## 2019-11-09 DIAGNOSIS — R06.02 SHORTNESS OF BREATH: ICD-10-CM

## 2019-11-09 PROCEDURE — 71046 X-RAY EXAM CHEST 2 VIEWS: CPT | Mod: 26

## 2019-11-09 PROCEDURE — 93010 ELECTROCARDIOGRAM REPORT: CPT

## 2019-11-09 PROCEDURE — 99284 EMERGENCY DEPT VISIT MOD MDM: CPT

## 2019-11-09 RX ORDER — IPRATROPIUM/ALBUTEROL SULFATE 18-103MCG
3 AEROSOL WITH ADAPTER (GRAM) INHALATION ONCE
Refills: 0 | Status: COMPLETED | OUTPATIENT
Start: 2019-11-09 | End: 2019-11-09

## 2019-11-09 RX ORDER — IBUPROFEN 200 MG
600 TABLET ORAL ONCE
Refills: 0 | Status: COMPLETED | OUTPATIENT
Start: 2019-11-09 | End: 2019-11-09

## 2019-11-09 RX ORDER — ALBUTEROL 90 UG/1
1 AEROSOL, METERED ORAL ONCE
Refills: 0 | Status: COMPLETED | OUTPATIENT
Start: 2019-11-09 | End: 2019-11-09

## 2019-11-09 RX ADMIN — Medication 40 MILLIGRAM(S): at 12:59

## 2019-11-09 RX ADMIN — Medication 3 MILLILITER(S): at 12:40

## 2019-11-09 RX ADMIN — Medication 3 MILLILITER(S): at 12:55

## 2019-11-09 RX ADMIN — Medication 3 MILLILITER(S): at 12:53

## 2019-11-09 RX ADMIN — ALBUTEROL 1 PUFF(S): 90 AEROSOL, METERED ORAL at 13:56

## 2019-11-09 RX ADMIN — Medication 600 MILLIGRAM(S): at 12:54

## 2019-11-09 NOTE — ED ADULT NURSE NOTE - NSIMPLEMENTINTERV_GEN_ALL_ED
Implemented All Universal Safety Interventions:  [place] to call system. Call bell, personal items and telephone within reach. Instruct patient to call for assistance. Room bathroom lighting operational. Non-slip footwear when patient is off stretcher. Physically safe environment: no spills, clutter or unnecessary equipment. Stretcher in lowest position, wheels locked, appropriate side rails in place.

## 2019-11-09 NOTE — ED ADULT NURSE NOTE - OBJECTIVE STATEMENT
c/o shortness of breath since 4 in the morning c/o shortness of breath since yesterday. As per pt she woke up 4 in the morning because of shortness of breath. Pt denies any pain except when she is taking a deep breath in she has a little of chest pain.

## 2019-11-09 NOTE — ED PROVIDER NOTE - NSFOLLOWUPINSTRUCTIONS_ED_ALL_ED_FT
Shortness of breath    Shortness of breath means you have trouble breathing and could indicate a medical problem. Causes include lung diseases, heart disease, low amount of red blood cells (anemia), poor physical fitness, being overweight, smoking, etc. Your health care provider may not be able to find a cause for your shortness of breath after your exam. In this case, it is important to have a follow-up exam with your primary care physician as instructed. If medicines were prescribed, take them as directed for the full length of time directed. Refrain from tobacco products.    SEEK IMMEDIATE MEDICAL CARE IF YOU HAVE THE FOLLOWING SYMPTOMS: worsening shortness of breath, chest pain, back pain, abdominal pain, fever, coughing up blood, lightheadedness/dizziness.     Cough    Coughing is a reflex that clears your throat and your airways. Coughing helps to heal and protect your lungs. It is normal to cough occasionally, but a cough that happens with other symptoms or lasts a long time may be a sign of a condition that needs treatment. Coughing may be caused by infections, asthma or COPD, smoking, postnasal drip, gastroesophageal reflux, as well as other medical conditions. Take medicines only as instructed by your health care provider. Avoid environments or triggers that causes you to cough at work or at home.    SEEK IMMEDIATE MEDICAL CARE IF YOU HAVE ANY OF THE FOLLOWING SYMPTOMS: coughing up blood, shortness of breath, rapid heart rate, chest pain, unexplained weight loss or night sweats.

## 2019-11-09 NOTE — ED PROVIDER NOTE - CLINICAL SUMMARY MEDICAL DECISION MAKING FREE TEXT BOX
Pt w/ cough, SOB. felt much better after nebs. CXR and EKG reviewed. Gave albuterol pump and instructions for use. dc home w/ pmd fup. Pt understands signs and symptoms for ED return.

## 2019-11-09 NOTE — ED PROVIDER NOTE - NS ED ROS FT
Review of Systems    Constitutional: (-) fever, (-) chills  Eyes/ENT: (-) blurry vision, (-) epistaxis, (-) sore throat  Cardiovascular: (+) chest pain with coughing, (-) syncope  Respiratory: (+) cough, (+) shortness of breath  Gastrointestinal: (-) pain, (-) nausea, (-) vomiting, (-) diarrhea  Musculoskeletal: (-) neck pain, (-) back pain, (-) joint pain  Integumentary: (-) rash, (-) edema  Neurological: (-) headache, (-) altered mental status  Psychiatric: (-) hallucinations  Allergic/Immunologic: (-) pruritus

## 2019-11-09 NOTE — ED PROVIDER NOTE - OBJECTIVE STATEMENT
40 yo F c/o SOB since yesterday. SOB is intermittent with no modifying factors. +cough. + chest wall discomfort when coughing. No fevers, chills, or n/v. No leg pains or leg swelling. No OCP use. Patient travelled from [place] on Tuesday. No family hx of clots.

## 2019-11-09 NOTE — ED PROVIDER NOTE - ATTENDING CONTRIBUTION TO CARE
40 y/o F no sig pmh non smoker, p/w sob and intermittent, non productive cough x1d. + tussive chest discomfort. No leg swelling, calf pain, prior DVT/PE/ MI. ROS PE above. + b/l diffuse wheezing, no pedal edema, calf ttp or cords. IMP: bronchitis/ viral illness vs reactive airway-type process. P: cxr, ekg, nebs, steroid ,reassess.

## 2019-11-09 NOTE — ED PROVIDER NOTE - PATIENT PORTAL LINK FT
You can access the FollowMyHealth Patient Portal offered by [facility] by registering at the following website: [email or web address]. By joining [facility]’s FollowMyHealth portal, you will also be able to view your health information using other applications (apps) compatible with our system.

## 2019-11-09 NOTE — ED PROVIDER NOTE - PHYSICAL EXAMINATION
Gen: Alert, NAD, well appearing  Head: NC, AT, PERRL, EOMI, normal lids/conjunctiva  ENT: normal hearing, patent oropharynx without erythema/exudate  Neck: +supple, no tenderness/meningismus,  Pulm: Bilateral BS, +wheeze, + rhonchi  CV: RRR, no murmer  Abd: soft, NT/ND  Mskel: no edema/erythema/cyanosis. No leg pains or leg swelling  Skin: no rash, warm/dry  Neuro: AAOx3, no sensory/motor deficits

## 2020-01-08 ENCOUNTER — APPOINTMENT (OUTPATIENT)
Dept: CARDIOLOGY | Facility: CLINIC | Age: 40
End: 2020-01-08
Payer: MEDICAID

## 2020-01-08 PROCEDURE — 99204 OFFICE O/P NEW MOD 45 MIN: CPT

## 2020-01-08 PROCEDURE — 93000 ELECTROCARDIOGRAM COMPLETE: CPT

## 2020-02-28 ENCOUNTER — APPOINTMENT (OUTPATIENT)
Dept: CARDIOLOGY | Facility: CLINIC | Age: 40
End: 2020-02-28
Payer: MEDICAID

## 2020-02-28 PROCEDURE — 93325 DOPPLER ECHO COLOR FLOW MAPG: CPT

## 2020-02-28 PROCEDURE — 93320 DOPPLER ECHO COMPLETE: CPT

## 2020-02-28 PROCEDURE — 93351 STRESS TTE COMPLETE: CPT

## 2020-03-20 NOTE — ASU PREOP CHECKLIST - TEMPERATURE IN FAHRENHEIT (DEGREES F)
HPI Notes    Name: Teofilo Deshpande  : 1970         Chief Complaint:     Chief Complaint   Patient presents with    Weight Management     questions trying on medication    Foot Pain     right foot pain. Seen in ED [facility] 3/13/20, treated on prednisone    Leg Pain     pain worse at night, questions increasing flexeril       History of Present Illness:        Howard Pike comes to office for evaluation of pain in her legs and weight problems. She is morbidly obese with BMI 42.7.   gained weight after delivering her 2 children. Since then she has been steadily gaining weigh.  usually cooks a lot of \"good food\". She likes chicken, macaroni cheese, coleslaw, pizza.  tried to cut back on her eating, but has been unsuccessful. Eats too much food  for dinners. She eats ice cream most of the time. Says \" I will go nuts if don't eat ice cream\". She usually does not eat snacks. Used to drink 1-2 regular [employer] [facility]. She does not eat salads as often as she should. She tries to ride a bike when it's warm outside. Has pain in legs for many years. States the pain has been worsening for the past 6-8 months. Usually happens before bedtime. Describes it as jabbing, sharp, shooting. She thinks the pain originates in  the lumbar back  And radiates down her posterior thighs all the way to her feet. She has been taking Flexeril at bedtime but it stopped helping. She tried Ibuprofen and Prednisone. It did not help. Tylenol did not help. In the past tried lidocaine patches but it would not stay on.  has a h/o \" lumbar arthritis\" and back issues in the past.    was told  by some doctor she will be in a wheelchair in 6 months due to back problems. She tried physical therapy and it did not help.             Past Medical History:     Past Medical History:   Diagnosis Date    Anemia     Depression     Diabetes mellitus ([address]     Hyperlipidemia     Hypertension       Reviewed all 99 health maintenance requirements and orderedappropriate tests  Health Maintenance Due   Topic Date Due    Pneumococcal 0-64 years Vaccine (1 of 1 - PPSV23) 02/15/1975    HIV screen  02/15/1984    Hepatitis B vaccine (1 of 3 - Risk 3-dose series) 02/15/1988    DTaP/Tdap/Td vaccine (1 - Tdap) 02/15/1988    Cervical cancer screen  02/15/1990    Breast cancer screen  02/15/2019    Shingles Vaccine (1 of 2) 02/15/2019       Past Surgical History:     Past Surgical History:   Procedure Laterality Date    CHOLECYSTECTOMY      HYSTERECTOMY      TONSILLECTOMY          Medications:       Prior to Admission medications    Medication Sig Start Date End Date Taking? Authorizing Provider   tiZANidine (ZANAFLEX) 2 MG tablet Take 1 tablet by mouth 3 times daily as needed (musle spasm) 3/20/20  Yes Raghav López MD   lidocaine (LMX) 4 % cream Apply topically as needed.  3/20/20  Yes Raghav López MD   predniSONE (DELTASONE) 20 MG tablet 2 tablets daily x3 days then 1 tablet daily 3/13/20  Yes Rose Pagan MD   Cholecalciferol (VITAMIN D) 50 MCG (2000 UT) TABS tablet Take 1 tablet by mouth daily 2/6/20  Yes TOSHA Grewal CNP   lisinopril (PRINIVIL;ZESTRIL) 40 MG tablet Take 1 tablet by mouth daily 2/6/20  Yes TOSHA Grewal CNP   simvastatin (ZOCOR) 40 MG tablet Take 1 tablet by mouth nightly 2/6/20  Yes TOSHA Grewal CNP   sertraline (ZOLOFT) 100 MG tablet Take 1 tablet by mouth daily 2/6/20  Yes TOSHA Grewal CNP   loratadine (CLARITIN) 10 MG tablet Take 1 tablet by mouth daily 2/6/20  Yes TOSHA Grewal CNP   omeprazole (PRILOSEC) 20 MG delayed release capsule Take 1 capsule by mouth daily 2/6/20  Yes TOSHA Grewal CNP   Multiple Vitamin (RA ONE DAILY ESSENTIAL) TABS take 1 tablet by mouth daily 2/6/20  Yes TOSHA Grewal CNP   pramipexole (MIRAPEX) 1 MG tablet take 1 tablet by mouth three times a day 2/6/20  Yes Caroline Mancuso well-developed. She is obese. HENT:      Head: Normocephalic and atraumatic. Nose: Nose normal. No congestion. Mouth/Throat:      Pharynx: Oropharynx is clear. Eyes:      General: No scleral icterus. Right eye: No discharge. Left eye: No discharge. Conjunctiva/sclera: Conjunctivae normal.   Neck:      Thyroid: No thyromegaly. Cardiovascular:      Rate and Rhythm: Normal rate and regular rhythm. Heart sounds: Normal heart sounds. No murmur. Pulmonary:      Effort: Pulmonary effort is normal.      Breath sounds: Normal breath sounds. No wheezing or rales. Abdominal:      General: Bowel sounds are normal. There is no distension. Palpations: Abdomen is soft. There is no mass. Tenderness: There is no abdominal tenderness. Musculoskeletal: Normal range of motion. General: Tenderness (lumbosacral area) present. No deformity. Right lower leg: No edema. Left lower leg: No edema. Lymphadenopathy:      Cervical: No cervical adenopathy. Skin:     General: Skin is warm and dry. Findings: No rash. Neurological:      General: No focal deficit present. Mental Status: She is alert and oriented to person, place, and time. Cranial Nerves: No cranial nerve deficit. Sensory: No sensory deficit. Coordination: Coordination normal.      Gait: Gait abnormal (limping). Deep Tendon Reflexes: Reflexes normal.   Psychiatric:         Mood and Affect: Mood normal.         Behavior: Behavior normal.         Thought Content:  Thought content normal.         Judgment: Judgment normal.               Data:     Lab Results   Component Value Date     02/22/2020    K 4.5 02/22/2020     02/22/2020    CO2 25 02/22/2020    BUN 14 02/22/2020    CREATININE 0.69 02/22/2020    GLUCOSE 125 02/22/2020    PROT 7.3 02/22/2020    LABALBU 4.3 02/22/2020    BILITOT 0.15 02/22/2020    ALKPHOS 114 02/22/2020    AST 31 02/22/2020    ALT 37 02/22/2020 Lab Results   Component Value Date    WBC 7.3 02/22/2020    RBC 5.17 02/22/2020    HGB 12.9 02/22/2020    HCT 41.6 02/22/2020    MCV 80.5 02/22/2020    MCH 25.0 02/22/2020    MCHC 31.0 02/22/2020    RDW 15.3 02/22/2020     02/22/2020    MPV NOT REPORTED 02/22/2020     Lab Results   Component Value Date    TSH 1.00 01/19/2020     Lab Results   Component Value Date    CHOL 168 05/02/2019    HDL 44 05/02/2019    LABA1C 6.4 02/04/2020          Assessment & Plan        Diagnosis Orders   1. Pain in both lower extremities   We will check labs to rule out autoimmune diseases, rheumatoid arthritis, gout. Also check x-ray of the lumbar spine as I suspect her pain is most likely related to lumbar spine degenerative joint disease. We will follow-up in 4 weeks to discuss results and further plans. She would like to try lidocaine cream.  We also decided to discontinue Flexeril since it is not helping and try Zanaflex XR LUMBAR SPINE (2-3 VIEWS)    Uric Acid    DU Screen With Reflex    CK    Myoglobin, Serum    Rheumatoid Factor    Sedimentation Rate    tiZANidine (ZANAFLEX) 2 MG tablet   2. Mild episode of recurrent major depressive disorder (HCC)   Mood is stable    3. Morbid obesity ([address]   Difficult situation as she is not willing to cut down on her portions or change her eating habits. Offered referral to dietitian, she declined stating that has seen a dietitian in the past.  Strongly advised to avoid ice cream, chips, unhealthy snacks, large meal portions. Advised to try eat more salads, vegetables, fruit. Advised against Adipex due to her difficulty with diet adherence                    Completed Refills   Requested Prescriptions     Signed Prescriptions Disp Refills    tiZANidine (ZANAFLEX) 2 MG tablet 90 tablet 1     Sig: Take 1 tablet by mouth 3 times daily as needed (musle spasm)    lidocaine (LMX) 4 % cream 30 g 1     Sig: Apply topically as needed. No follow-ups on file.      Orders Placed This Encounter   Medications    tiZANidine (ZANAFLEX) 2 MG tablet     Sig: Take 1 tablet by mouth 3 times daily as needed (musle spasm)     Dispense:  90 tablet     Refill:  1    lidocaine (LMX) 4 % cream     Sig: Apply topically as needed. Dispense:  30 g     Refill:  1     Orders Placed This Encounter   Procedures    XR LUMBAR SPINE (2-3 VIEWS)     Standing Status:   Future     Standing Expiration Date:   3/20/2021    Uric Acid     Standing Status:   Future     Standing Expiration Date:   3/20/2021    DU Screen With Reflex     Standing Status:   Future     Standing Expiration Date:   3/20/2021    CK     Standing Status:   Future     Standing Expiration Date:   3/20/2021    Myoglobin, Serum     Standing Status:   Future     Standing Expiration Date:   3/20/2021    Rheumatoid Factor     Standing Status:   Future     Standing Expiration Date:   3/20/2021    Sedimentation Rate     Standing Status:   Future     Standing Expiration Date:   3/20/2021         Patient Instructions     SURVEY:    You may be receiving a survey from [employer] regarding your visit today. Please complete the survey to enable us to provide the highest quality of care to you and your family. If you cannot score us a very good on any question, please call the office to discuss how we could of made your experience a very good one. Thank you. Electronically signed by Maykel Malcolm MD on 3/20/2020 at 3:45 PM           Completed Refills      Requested Prescriptions     Signed Prescriptions Disp Refills    tiZANidine (ZANAFLEX) 2 MG tablet 90 tablet 1     Sig: Take 1 tablet by mouth 3 times daily as needed (musle spasm)    lidocaine (LMX) 4 % cream 30 g 1     Sig: Apply topically as needed.

## 2020-10-01 NOTE — H&P PST ADULT - PRO PAIN EXPRESSION

## 2022-08-08 NOTE — PRE-ANESTHESIA EVALUATION ADULT - WEIGHT IN KG
[FreeTextEntry1] : I, Dr. Tanner Elder, personally performed the evaluation and management (E/M) services for this new patient.  That E/M includes conducting the initial examination, assessing all conditions, and establishing the plan of care.  Today, my ACP, Ashli Reed NP, was here to observe my evaluation and management services for this patient to be followed going forward. \par \par The documentation for this encounter was entered by Tamiko Curtis acting as a scribe for Dr.Gary Eledr.\par 
82.1

## 2023-03-27 NOTE — ED PROVIDER NOTE - CARE PLAN
DM EYE REPORT: Tried calling patient. No answer. Left voicemail. Requested most recent eye report from previous eye provider.      Principal Discharge DX:	Cough  Secondary Diagnosis:	Wheeze

## 2024-05-09 NOTE — ASU DISCHARGE PLAN (ADULT/PEDIATRIC) - PROCEDURE
Excision of upper back sebaceous cyst Hx of PE on CTA 10/29/22 w/ RLL segmental/subsegmental pulmonary embolism  - Repeat CTA 2/28/23 w/o PE.   - DVT studies at OSH 4/22/24 negative  - continue Eliquis for now  - CTA reviewed, appears negative for acute PE

## 2025-07-01 ENCOUNTER — APPOINTMENT (OUTPATIENT)
Dept: NEUROLOGY | Facility: CLINIC | Age: 45
End: 2025-07-01
Payer: COMMERCIAL

## 2025-07-01 VITALS
SYSTOLIC BLOOD PRESSURE: 95 MMHG | HEART RATE: 79 BPM | DIASTOLIC BLOOD PRESSURE: 66 MMHG | HEIGHT: 68 IN | BODY MASS INDEX: 28.79 KG/M2 | WEIGHT: 190 LBS

## 2025-07-01 PROBLEM — G51.0 BELL'S PALSY: Status: ACTIVE | Noted: 2025-07-01

## 2025-07-01 PROCEDURE — 99203 OFFICE O/P NEW LOW 30 MIN: CPT

## 2025-08-16 ENCOUNTER — OUTPATIENT (OUTPATIENT)
Dept: OUTPATIENT SERVICES | Facility: HOSPITAL | Age: 45
LOS: 1 days | End: 2025-08-16
Payer: COMMERCIAL

## 2025-08-16 ENCOUNTER — RESULT REVIEW (OUTPATIENT)
Age: 45
End: 2025-08-16

## 2025-08-16 DIAGNOSIS — Z00.8 ENCOUNTER FOR OTHER GENERAL EXAMINATION: ICD-10-CM

## 2025-08-16 DIAGNOSIS — G51.0 BELL'S PALSY: ICD-10-CM

## 2025-08-16 DIAGNOSIS — Z98.51 TUBAL LIGATION STATUS: Chronic | ICD-10-CM

## 2025-08-16 PROCEDURE — 70551 MRI BRAIN STEM W/O DYE: CPT | Mod: 26

## 2025-08-16 PROCEDURE — 70551 MRI BRAIN STEM W/O DYE: CPT

## 2025-08-17 DIAGNOSIS — G51.0 BELL'S PALSY: ICD-10-CM

## 2025-09-03 ENCOUNTER — APPOINTMENT (OUTPATIENT)
Dept: NEUROLOGY | Facility: CLINIC | Age: 45
End: 2025-09-03
Payer: COMMERCIAL

## 2025-09-03 ENCOUNTER — APPOINTMENT (OUTPATIENT)
Dept: NEUROLOGY | Facility: CLINIC | Age: 45
End: 2025-09-03

## 2025-09-03 VITALS
DIASTOLIC BLOOD PRESSURE: 82 MMHG | BODY MASS INDEX: 28.79 KG/M2 | WEIGHT: 190 LBS | HEIGHT: 68 IN | SYSTOLIC BLOOD PRESSURE: 112 MMHG | HEART RATE: 90 BPM

## 2025-09-03 DIAGNOSIS — G51.0 BELL'S PALSY: ICD-10-CM

## 2025-09-03 PROCEDURE — 99214 OFFICE O/P EST MOD 30 MIN: CPT

## 2025-09-03 RX ORDER — PREDNISONE 20 MG/1
20 TABLET ORAL DAILY
Qty: 8 | Refills: 0 | Status: ACTIVE | COMMUNITY
Start: 2025-09-03 | End: 1900-01-01

## 2025-09-04 ENCOUNTER — APPOINTMENT (OUTPATIENT)
Dept: UROLOGY | Facility: CLINIC | Age: 45
End: 2025-09-04